# Patient Record
Sex: MALE | Race: BLACK OR AFRICAN AMERICAN
[De-identification: names, ages, dates, MRNs, and addresses within clinical notes are randomized per-mention and may not be internally consistent; named-entity substitution may affect disease eponyms.]

---

## 2018-11-23 ENCOUNTER — HOSPITAL ENCOUNTER (EMERGENCY)
Dept: HOSPITAL 61 - ER | Age: 63
Discharge: HOME | End: 2018-11-23
Payer: COMMERCIAL

## 2018-11-23 VITALS — DIASTOLIC BLOOD PRESSURE: 89 MMHG | SYSTOLIC BLOOD PRESSURE: 120 MMHG

## 2018-11-23 VITALS — WEIGHT: 165 LBS | BODY MASS INDEX: 20.51 KG/M2 | HEIGHT: 75 IN

## 2018-11-23 DIAGNOSIS — K59.00: Primary | ICD-10-CM

## 2018-11-23 DIAGNOSIS — N39.0: ICD-10-CM

## 2018-11-23 LAB
APTT PPP: YELLOW S
BACTERIA #/AREA URNS HPF: (no result) /HPF
BILIRUB UR QL STRIP: NEGATIVE
FIBRINOGEN PPP-MCNC: CLEAR MG/DL
NITRITE UR QL STRIP: POSITIVE
PH UR STRIP: 6.5 [PH]
PROT UR STRIP-MCNC: NEGATIVE MG/DL
RBC #/AREA URNS HPF: 0 /HPF (ref 0–2)
SQUAMOUS #/AREA URNS LPF: (no result) /LPF
UROBILINOGEN UR-MCNC: 1 MG/DL
WBC #/AREA URNS HPF: >40 /HPF (ref 0–4)

## 2018-11-23 PROCEDURE — 87086 URINE CULTURE/COLONY COUNT: CPT

## 2018-11-23 PROCEDURE — 81001 URINALYSIS AUTO W/SCOPE: CPT

## 2018-11-23 PROCEDURE — 74018 RADEX ABDOMEN 1 VIEW: CPT

## 2018-11-23 NOTE — RAD
Indication: Constipation for 3 days.

 

TECHNIQUE: Single AP view of the abdomen and pelvis

 

COMPARISON: None

 

FINDINGS:

Visualized lung bases are clear. No pneumoperitoneum. No abnormally 

dilated bowel loops. Moderate diffuse colonic and rectal stool burden. 

Mild multilevel degenerative disc disease is seen in the spine. Mild 

bilateral hip joint osteoarthritis.

 

IMPRESSION:

Moderate diffuse colonic stool burden. No radiographic evidence of bowel 

obstruction.

 

Electronically signed by: Andre Wiggins DO (11/23/2018 4:07 PM) [facility]

## 2018-11-23 NOTE — PHYS DOC
Adult General


Chief Complaint


Chief Complaint:  CONSTIPATION





HPI


HPI





Patient is a 63  year old male who presents with states no bowel movement 2 

days. Patient states he took mag citrate 2 days ago without results. Patient 

denies nausea, vomiting, abdominal pain, dysuria, fever. Rates his pain a 0-10. 

Patient has no known drug allergies and states he takes no medications daily, 

has had no surgeries, has no medical history.





Review of Systems


Review of Systems





Constitutional: Denies fever or chills []


Respiratory: Denies cough or shortness of breath []


Cardiovascular: No additional information not addressed in HPI []


GI: Constipation. Denies abdominal pain, nausea, vomiting, bloody stools or 

diarrhea []


: Denies dysuria or hematuria []





All other systems were reviewed and found to be within normal limits, except as 

documented in this note.





Physical Exam


Physical Exam





Constitutional: Well developed, well nourished, no acute distress, non-toxic 

appearance. []


HENT: Normocephalic, atraumatic, bilateral external ears normal, oropharynx 

moist, no oral exudates, nose normal. []


Eyes: PERRLA, EOMI, conjunctiva normal, no discharge. [] 


Neck: Normal range of motion, no tenderness, supple, no stridor. [] 


Cardiovascular:Heart rate regular rhythm, no murmur []


Lungs & Thorax:  Bilateral breath sounds clear to auscultation []


Abdomen: Bowel sounds normal, soft, no tenderness, no masses, no pulsatile 

masses. [] 


Skin: Warm, dry, no erythema, no rash. [] 


Back: No tenderness, no CVA tenderness. [] 


Extremities: No tenderness, no cyanosis, no clubbing, ROM intact, no edema. [] 


Neurologic: Alert and oriented X 3, normal motor function, normal sensory 

function, no focal deficits noted. []


Psychologic: Affect normal, judgement normal, mood normal. []





Current Patient Data


Vital Signs





 Vital Signs








  Date Time  Temp Pulse Resp B/P (MAP) Pulse Ox O2 Delivery O2 Flow Rate FiO2


 


18 18:37  82 16 120/89 (99) 97 Room Air  


 


18 15:20 98.8       





 98.8       








Lab Values





 Laboratory Tests








Test


 18


17:58


 


Urine Collection Type Unknown  


 


Urine Color Yellow  


 


Urine Clarity Clear  


 


Urine pH 6.5  


 


Urine Specific Gravity 1.020  


 


Urine Protein


 Negative mg/dL


(NEG-TRACE)


 


Urine Glucose (UA)


 Negative mg/dL


(NEG)


 


Urine Ketones (Stick)


 Negative mg/dL


(NEG)


 


Urine Blood


 Negative (NEG)





 


Urine Nitrite


 Positive (NEG)





 


Urine Bilirubin


 Negative (NEG)





 


Urine Urobilinogen Dipstick


 1.0 mg/dL (0.2


mg/dL)


 


Urine Leukocyte Esterase


 Moderate (NEG)





 


Urine RBC 0 /HPF (0-2)  


 


Urine WBC


 >40 /HPF (0-4)





 


Urine Squamous Epithelial


Cells Occ /LPF  





 


Urine Bacteria


 Many /HPF


(0-FEW)


 


Urine Mucus Mod /LPF  











EKG


EKG


[]





Radiology/Procedures


Radiology/Procedures


[]


Impressions:


[facility]


 [address] [place] [address]


 [phone]


 


 IMAGING REPORT





 Signed





PATIENT: MARTELL RIVERA ACCOUNT: [id number] MRN#: [id number]


: 1955 LOCATION: ER AGE: 63


SEX: M EXAM DT: 18 ACCESSION#: [id number]


STATUS: REG ER ORD. PHYSICIAN: JOLYNN REBOLLEDO 


REASON: constipation


PROCEDURE: KUB





Indication: Constipation for 3 days.


 


TECHNIQUE: Single AP view of the abdomen and pelvis


 


COMPARISON: None


 


FINDINGS:


Visualized lung bases are clear. No pneumoperitoneum. No abnormally 


dilated bowel loops. Moderate diffuse colonic and rectal stool burden. 


Mild multilevel degenerative disc disease is seen in the spine. Mild 


bilateral hip joint osteoarthritis.


 


IMPRESSION:


Moderate diffuse colonic stool burden. No radiographic evidence of bowel 


obstruction.


 


Electronically signed by: Andre Puente DO (2018 4:07 PM) [facility]














DICTATED and SIGNED BY:     ANDRE PUENTE DO


DATE:     18 1606








Course & Med Decision Making


Course & Med Decision Making


Patient is a 63  year old male who presents with states no bowel movement 2 

days. Patient states he took mag citrate 2 days ago without results. Patient 

denies nausea, vomiting, abdominal pain, dysuria, fever. Rates his pain a 0-10. 

Patient has no known drug allergies and states he takes no medications daily, 

has had no surgeries, has no medical history. Alert and oriented. Skin pink 

warm and dry. Abdomen soft and nontender and nondistended. Abdominal KUB shows 

Moderate diffuse colonic stool burden. No radiographic evidence of bowel 

obstruction. Lungs are clear to auscultation in all lobes. Heart rate regular 

without murmur area afebrile. Patient is given a soapsuds enema and patient 

states he had very good results and feels a lot better. Patient's urine also 

shows infection with nitrites. Patient is given prescription for mag citrate if 

he should need it and antibiotic for his UTI. Patient follow-up with his 

primary care if not getting any better.





Dragon Disclaimer


Dragon Disclaimer


This electronic medical record was generated, in whole or in part, using a 

voice recognition dictation system.





Departure


Departure


Impression:  


 Primary Impression:  


 Constipation


 Additional Impression:  


 UTI (urinary tract infection)


Disposition:   HOME, SELF-CARE


Condition:  STABLE


Referrals:  


NO PCP (PCP)


Patient Instructions:  Constipation, Adult, Urinary Tract Infection





Additional Instructions:  


Try taking Colace stool softener every day. Follow up with your primary care 

physician.


Scripts


Cephalexin (KEFLEX) 500 Mg Capsule


1 CAP PO BID, #14 CAP


   Prov: JOLYNN REBOLLEDO         18 


Magnesium Citrate (MAGNESIUM CITRATE) 296 Ml Solution


296 ML PO ONCE, #296 ML


   Prov: JOLYNN REBOLLEDO         18





Problem Qualifiers








 Primary Impression:  


 Constipation


 Constipation type:  unspecified constipation type  Qualified Codes:  K59.00 - 

Constipation, unspecified


 Additional Impression:  


 UTI (urinary tract infection)


 Urinary tract infection type:  site unspecified  Hematuria presence:  without 

hematuria  Qualified Codes:  N39.0 - Urinary tract infection, site not specified








JOLYNN REBOLLEDO 2018 16:39